# Patient Record
Sex: FEMALE | Race: WHITE | Employment: FULL TIME | URBAN - NONMETROPOLITAN AREA
[De-identification: names, ages, dates, MRNs, and addresses within clinical notes are randomized per-mention and may not be internally consistent; named-entity substitution may affect disease eponyms.]

---

## 2017-06-12 ENCOUNTER — TELEPHONE (OUTPATIENT)
Dept: FAMILY MEDICINE CLINIC | Age: 32
End: 2017-06-12

## 2017-06-15 RX ORDER — MONTELUKAST SODIUM 4 MG/1
1 TABLET, CHEWABLE ORAL 2 TIMES DAILY
Qty: 60 TABLET | Refills: 3 | Status: SHIPPED | OUTPATIENT
Start: 2017-06-15 | End: 2017-07-21

## 2017-07-21 ENCOUNTER — OFFICE VISIT (OUTPATIENT)
Dept: FAMILY MEDICINE CLINIC | Age: 32
End: 2017-07-21

## 2017-07-21 VITALS
SYSTOLIC BLOOD PRESSURE: 122 MMHG | OXYGEN SATURATION: 98 % | HEART RATE: 71 BPM | DIASTOLIC BLOOD PRESSURE: 84 MMHG | HEIGHT: 61 IN | WEIGHT: 190 LBS | BODY MASS INDEX: 35.87 KG/M2

## 2017-07-21 DIAGNOSIS — N92.6 IRREGULAR MENSTRUAL CYCLE: Primary | ICD-10-CM

## 2017-07-21 DIAGNOSIS — R53.83 FATIGUE, UNSPECIFIED TYPE: ICD-10-CM

## 2017-07-21 LAB
A/G RATIO: 1.4 (ref 1.1–2.2)
ALBUMIN SERPL-MCNC: 4.2 G/DL (ref 3.4–5)
ALP BLD-CCNC: 140 U/L (ref 40–129)
ALT SERPL-CCNC: 16 U/L (ref 10–40)
ANION GAP SERPL CALCULATED.3IONS-SCNC: 11 MMOL/L (ref 3–16)
AST SERPL-CCNC: 16 U/L (ref 15–37)
BASOPHILS ABSOLUTE: 0.1 K/UL (ref 0–0.2)
BASOPHILS RELATIVE PERCENT: 0.9 %
BILIRUB SERPL-MCNC: <0.2 MG/DL (ref 0–1)
BUN BLDV-MCNC: 14 MG/DL (ref 7–20)
CALCIUM SERPL-MCNC: 8.8 MG/DL (ref 8.3–10.6)
CHLORIDE BLD-SCNC: 106 MMOL/L (ref 99–110)
CO2: 24 MMOL/L (ref 21–32)
CREAT SERPL-MCNC: 0.6 MG/DL (ref 0.6–1.1)
EOSINOPHILS ABSOLUTE: 0.2 K/UL (ref 0–0.6)
EOSINOPHILS RELATIVE PERCENT: 2.6 %
FOLATE: 10.47 NG/ML (ref 4.78–24.2)
GFR AFRICAN AMERICAN: >60
GFR NON-AFRICAN AMERICAN: >60
GLOBULIN: 3 G/DL
GLUCOSE BLD-MCNC: 98 MG/DL (ref 70–99)
HCT VFR BLD CALC: 36.2 % (ref 36–48)
HEMOGLOBIN: 12 G/DL (ref 12–16)
LYMPHOCYTES ABSOLUTE: 1.2 K/UL (ref 1–5.1)
LYMPHOCYTES RELATIVE PERCENT: 18.2 %
MCH RBC QN AUTO: 26.5 PG (ref 26–34)
MCHC RBC AUTO-ENTMCNC: 33.2 G/DL (ref 31–36)
MCV RBC AUTO: 79.9 FL (ref 80–100)
MONOCYTES ABSOLUTE: 0.6 K/UL (ref 0–1.3)
MONOCYTES RELATIVE PERCENT: 8.8 %
NEUTROPHILS ABSOLUTE: 4.7 K/UL (ref 1.7–7.7)
NEUTROPHILS RELATIVE PERCENT: 69.5 %
PDW BLD-RTO: 13.7 % (ref 12.4–15.4)
PLATELET # BLD: 353 K/UL (ref 135–450)
PMV BLD AUTO: 8.1 FL (ref 5–10.5)
POTASSIUM SERPL-SCNC: 4.3 MMOL/L (ref 3.5–5.1)
RBC # BLD: 4.53 M/UL (ref 4–5.2)
SODIUM BLD-SCNC: 141 MMOL/L (ref 136–145)
T4 FREE: 0.9 NG/DL (ref 0.9–1.8)
TOTAL PROTEIN: 7.2 G/DL (ref 6.4–8.2)
TSH SERPL DL<=0.05 MIU/L-ACNC: 1.36 UIU/ML (ref 0.27–4.2)
VITAMIN B-12: 495 PG/ML (ref 211–911)
WBC # BLD: 6.8 K/UL (ref 4–11)

## 2017-07-21 PROCEDURE — 36415 COLL VENOUS BLD VENIPUNCTURE: CPT | Performed by: NURSE PRACTITIONER

## 2017-07-21 PROCEDURE — 99213 OFFICE O/P EST LOW 20 MIN: CPT | Performed by: NURSE PRACTITIONER

## 2017-07-21 ASSESSMENT — ENCOUNTER SYMPTOMS
EYES NEGATIVE: 1
ALLERGIC/IMMUNOLOGIC NEGATIVE: 1
GASTROINTESTINAL NEGATIVE: 1
RESPIRATORY NEGATIVE: 1

## 2017-09-01 ENCOUNTER — TELEPHONE (OUTPATIENT)
Dept: FAMILY MEDICINE CLINIC | Age: 32
End: 2017-09-01

## 2017-09-21 ENCOUNTER — TELEPHONE (OUTPATIENT)
Dept: FAMILY MEDICINE CLINIC | Age: 32
End: 2017-09-21

## 2017-09-28 ENCOUNTER — TELEPHONE (OUTPATIENT)
Dept: FAMILY MEDICINE CLINIC | Age: 32
End: 2017-09-28

## 2017-09-28 RX ORDER — CEFDINIR 300 MG/1
300 CAPSULE ORAL 2 TIMES DAILY
Qty: 20 CAPSULE | Refills: 0 | Status: SHIPPED | OUTPATIENT
Start: 2017-09-28 | End: 2017-10-08

## 2017-10-25 ENCOUNTER — OFFICE VISIT (OUTPATIENT)
Dept: FAMILY MEDICINE CLINIC | Age: 32
End: 2017-10-25

## 2017-10-25 VITALS
SYSTOLIC BLOOD PRESSURE: 136 MMHG | WEIGHT: 194.8 LBS | DIASTOLIC BLOOD PRESSURE: 82 MMHG | HEART RATE: 108 BPM | HEIGHT: 61 IN | TEMPERATURE: 98.2 F | BODY MASS INDEX: 36.78 KG/M2 | OXYGEN SATURATION: 98 %

## 2017-10-25 DIAGNOSIS — J40 BRONCHITIS: Primary | ICD-10-CM

## 2017-10-25 PROCEDURE — 99213 OFFICE O/P EST LOW 20 MIN: CPT | Performed by: FAMILY MEDICINE

## 2017-10-25 RX ORDER — BENZONATATE 200 MG/1
200 CAPSULE ORAL 3 TIMES DAILY PRN
Qty: 30 CAPSULE | Refills: 0 | Status: SHIPPED | OUTPATIENT
Start: 2017-10-25 | End: 2017-11-04

## 2017-10-25 RX ORDER — DOXYCYCLINE HYCLATE 100 MG
100 TABLET ORAL 2 TIMES DAILY
Qty: 20 TABLET | Refills: 0 | Status: SHIPPED | OUTPATIENT
Start: 2017-10-25 | End: 2017-11-04

## 2017-10-25 ASSESSMENT — ENCOUNTER SYMPTOMS
NAUSEA: 0
VOMITING: 0
COUGH: 1

## 2017-11-06 ENCOUNTER — TELEPHONE (OUTPATIENT)
Dept: FAMILY MEDICINE CLINIC | Age: 32
End: 2017-11-06

## 2017-11-06 NOTE — TELEPHONE ENCOUNTER
Not sure that is a thyroid issue. Thyroid labs were ok 4 mo ago. Could consider rpt tsh, ft4, free t3.   Could also consider endo referral

## 2017-11-07 DIAGNOSIS — L65.9 HAIR LOSS: Primary | ICD-10-CM

## 2017-11-07 DIAGNOSIS — R45.86 MOOD ALTERED: ICD-10-CM

## 2017-11-20 ENCOUNTER — TELEPHONE (OUTPATIENT)
Dept: FAMILY MEDICINE CLINIC | Age: 32
End: 2017-11-20

## 2018-02-22 ENCOUNTER — OFFICE VISIT (OUTPATIENT)
Dept: ORTHOPEDIC SURGERY | Age: 33
End: 2018-02-22

## 2018-02-22 ENCOUNTER — TELEPHONE (OUTPATIENT)
Dept: ORTHOPEDIC SURGERY | Age: 33
End: 2018-02-22

## 2018-02-22 VITALS
BODY MASS INDEX: 36.8 KG/M2 | HEART RATE: 90 BPM | SYSTOLIC BLOOD PRESSURE: 129 MMHG | DIASTOLIC BLOOD PRESSURE: 91 MMHG | WEIGHT: 194.89 LBS | HEIGHT: 61 IN

## 2018-02-22 DIAGNOSIS — M25.572 ACUTE LEFT ANKLE PAIN: Primary | ICD-10-CM

## 2018-02-22 DIAGNOSIS — M25.372 ANKLE INSTABILITY, LEFT: ICD-10-CM

## 2018-02-22 PROCEDURE — L4361 PNEUMA/VAC WALK BOOT PRE OTS: HCPCS | Performed by: ORTHOPAEDIC SURGERY

## 2018-02-22 PROCEDURE — 99203 OFFICE O/P NEW LOW 30 MIN: CPT | Performed by: ORTHOPAEDIC SURGERY

## 2018-02-22 NOTE — PROGRESS NOTES
the right lower extremity does not show any tenderness, deformity or injury. Range of motion is unremarkable. There is no gross instability. There are no rashes, ulcerations or lesions. Strength and tone are normal.    Radiology:     X-rays obtained and reviewed in office:  Views 3  Location left ankle  Impression 2 views of the left foot obtained previously show no obvious fracture or mortise is well reduced          Assessment :  Left ankle sprain strain with instability    Impression:  Encounter Diagnosis   Name Primary?  Acute left ankle pain Yes       Office Procedures:  Orders Placed This Encounter   Procedures    OTS FP Pneumatic Walking Boot Tall DJO     Patient was prescribed a [employer]. The left ankle will require stabilization / immobilization from this semi-rigid / rigid orthosis to improve their function. The orthosis will assist in protecting the affected area, provide functional support and facilitate healing. The patient was educated and fit by a healthcare professional with expert knowledge and specialization in brace application while under the direct supervision of the physician. Verbal and written instructions for the use of and application of this item were provided. They were instructed to contact the office immediately should the brace result in increased pain, decreased sensation, increased swelling or worsening of the condition. Treatment Plan:  I spent 30 minutes with this patient and her mother greater than 50% of time face-to-face discussing treatment options. I would recommend that she go into a high tide boot which we gave her today along with Ace bandage she'll ice and elevate take anti-inflammatories and follow-up with me in 3 weeks repeat x-rays of the ankle and foot.   I gave her a note to be off of work until then 22 the worksite unable to accommodate use of crutches or a boot

## 2018-03-12 DIAGNOSIS — M25.372 ANKLE INSTABILITY, LEFT: Primary | ICD-10-CM

## 2018-03-19 ENCOUNTER — TELEPHONE (OUTPATIENT)
Dept: ORTHOPEDIC SURGERY | Age: 33
End: 2018-03-19

## 2018-03-20 DIAGNOSIS — M25.372 ANKLE INSTABILITY, LEFT: Primary | ICD-10-CM

## 2018-03-23 ENCOUNTER — OFFICE VISIT (OUTPATIENT)
Dept: ORTHOPEDIC SURGERY | Age: 33
End: 2018-03-23

## 2018-03-23 VITALS
HEART RATE: 95 BPM | HEIGHT: 61 IN | WEIGHT: 194.89 LBS | DIASTOLIC BLOOD PRESSURE: 90 MMHG | SYSTOLIC BLOOD PRESSURE: 141 MMHG | BODY MASS INDEX: 36.8 KG/M2

## 2018-03-23 DIAGNOSIS — M25.372 ANKLE INSTABILITY, LEFT: Primary | ICD-10-CM

## 2018-03-23 DIAGNOSIS — M79.672 LEFT FOOT PAIN: ICD-10-CM

## 2018-03-23 PROCEDURE — L1902 AFO ANKLE GAUNTLET PRE OTS: HCPCS | Performed by: ORTHOPAEDIC SURGERY

## 2018-03-23 PROCEDURE — 99212 OFFICE O/P EST SF 10 MIN: CPT | Performed by: ORTHOPAEDIC SURGERY

## 2018-03-26 ENCOUNTER — E-VISIT (OUTPATIENT)
Dept: FAMILY MEDICINE CLINIC | Age: 33
End: 2018-03-26

## 2018-03-26 DIAGNOSIS — J01.90 ACUTE SINUSITIS, RECURRENCE NOT SPECIFIED, UNSPECIFIED LOCATION: Primary | ICD-10-CM

## 2018-03-26 PROCEDURE — 99444 PR PHYSICIAN ONLINE EVALUATION & MANAGEMENT SERVICE: CPT | Performed by: FAMILY MEDICINE

## 2018-03-26 RX ORDER — CEFDINIR 300 MG/1
300 CAPSULE ORAL 2 TIMES DAILY
Qty: 20 CAPSULE | Refills: 0 | Status: SHIPPED | OUTPATIENT
Start: 2018-03-26 | End: 2018-04-05

## 2018-03-26 ASSESSMENT — LIFESTYLE VARIABLES: SMOKING_STATUS: NO

## 2018-04-20 ENCOUNTER — OFFICE VISIT (OUTPATIENT)
Dept: ORTHOPEDIC SURGERY | Age: 33
End: 2018-04-20

## 2018-04-20 VITALS
WEIGHT: 194.89 LBS | HEIGHT: 61 IN | DIASTOLIC BLOOD PRESSURE: 82 MMHG | SYSTOLIC BLOOD PRESSURE: 123 MMHG | BODY MASS INDEX: 36.8 KG/M2 | HEART RATE: 88 BPM

## 2018-04-20 DIAGNOSIS — M25.372 ANKLE INSTABILITY, LEFT: Primary | ICD-10-CM

## 2018-04-20 PROCEDURE — 99212 OFFICE O/P EST SF 10 MIN: CPT | Performed by: ORTHOPAEDIC SURGERY

## 2018-09-04 ENCOUNTER — TELEPHONE (OUTPATIENT)
Dept: FAMILY MEDICINE CLINIC | Age: 33
End: 2018-09-04

## 2018-09-04 RX ORDER — CEFDINIR 300 MG/1
300 CAPSULE ORAL 2 TIMES DAILY
Qty: 20 CAPSULE | Refills: 0 | Status: SHIPPED | OUTPATIENT
Start: 2018-09-04 | End: 2018-09-14

## 2019-01-15 ENCOUNTER — OFFICE VISIT (OUTPATIENT)
Dept: FAMILY MEDICINE CLINIC | Age: 34
End: 2019-01-15
Payer: COMMERCIAL

## 2019-01-15 VITALS
TEMPERATURE: 98.1 F | SYSTOLIC BLOOD PRESSURE: 110 MMHG | WEIGHT: 197.4 LBS | HEART RATE: 114 BPM | OXYGEN SATURATION: 96 % | BODY MASS INDEX: 37.27 KG/M2 | HEIGHT: 61 IN | DIASTOLIC BLOOD PRESSURE: 82 MMHG

## 2019-01-15 DIAGNOSIS — R11.11 NON-INTRACTABLE VOMITING WITHOUT NAUSEA, UNSPECIFIED VOMITING TYPE: ICD-10-CM

## 2019-01-15 DIAGNOSIS — J06.9 VIRAL URI: Primary | ICD-10-CM

## 2019-01-15 PROCEDURE — 99214 OFFICE O/P EST MOD 30 MIN: CPT | Performed by: FAMILY MEDICINE

## 2019-01-15 ASSESSMENT — ENCOUNTER SYMPTOMS
NAUSEA: 1
DIARRHEA: 0
VOMITING: 1

## 2019-03-06 ENCOUNTER — TELEPHONE (OUTPATIENT)
Dept: FAMILY MEDICINE CLINIC | Age: 34
End: 2019-03-06

## 2019-07-11 ENCOUNTER — TELEPHONE (OUTPATIENT)
Dept: FAMILY MEDICINE CLINIC | Age: 34
End: 2019-07-11

## 2019-07-11 RX ORDER — AMOXICILLIN 875 MG/1
875 TABLET, COATED ORAL 2 TIMES DAILY
Qty: 20 TABLET | Refills: 0 | Status: SHIPPED | OUTPATIENT
Start: 2019-07-11 | End: 2019-07-21

## 2019-07-11 NOTE — TELEPHONE ENCOUNTER
Pt called c/o scratchy throat, itchy eyes, nose is clogging up and she has to breath through her mouth. UP and down all night blowing her nose so she can breath. Mucus is green in color. Sxs for 36hrs. She's taking an allergy pill and it's not working. She cannot come in due to work. Would you be willing to call something in for her to [facility]. Call something on the $5 list please.

## 2020-02-05 ENCOUNTER — TELEPHONE (OUTPATIENT)
Dept: FAMILY MEDICINE CLINIC | Age: 35
End: 2020-02-05

## 2020-02-05 ENCOUNTER — OFFICE VISIT (OUTPATIENT)
Dept: FAMILY MEDICINE CLINIC | Age: 35
End: 2020-02-05
Payer: COMMERCIAL

## 2020-02-05 VITALS
WEIGHT: 204 LBS | DIASTOLIC BLOOD PRESSURE: 80 MMHG | HEART RATE: 82 BPM | SYSTOLIC BLOOD PRESSURE: 122 MMHG | OXYGEN SATURATION: 97 % | BODY MASS INDEX: 38.52 KG/M2

## 2020-02-05 LAB
BILIRUBIN, POC: NORMAL
BLOOD URINE, POC: NORMAL
CLARITY, POC: CLEAR
COLOR, POC: YELLOW
GLUCOSE URINE, POC: NORMAL
KETONES, POC: NORMAL
LEUKOCYTE EST, POC: NORMAL
NITRITE, POC: NORMAL
PH, POC: 5.5
PROTEIN, POC: NORMAL
SPECIFIC GRAVITY, POC: 1.02
UROBILINOGEN, POC: 0.2

## 2020-02-05 PROCEDURE — 99213 OFFICE O/P EST LOW 20 MIN: CPT | Performed by: NURSE PRACTITIONER

## 2020-02-05 PROCEDURE — 81002 URINALYSIS NONAUTO W/O SCOPE: CPT | Performed by: NURSE PRACTITIONER

## 2020-02-05 ASSESSMENT — PATIENT HEALTH QUESTIONNAIRE - PHQ9
1. LITTLE INTEREST OR PLEASURE IN DOING THINGS: 0
SUM OF ALL RESPONSES TO PHQ QUESTIONS 1-9: 0
SUM OF ALL RESPONSES TO PHQ QUESTIONS 1-9: 0
2. FEELING DOWN, DEPRESSED OR HOPELESS: 0
SUM OF ALL RESPONSES TO PHQ9 QUESTIONS 1 & 2: 0

## 2020-02-05 NOTE — PROGRESS NOTES
Jaison Putnam is a 27 y.o. female who presents with complaints of frequency, urgency, foul smelling urine. These symptoms have been present for 2 days. Accompanying symptoms include:  urinary frequency, chills and flank pain on bilateral.  The patient denies:  sweating. There \"has no idea\" a history of previous UTI . The patient is  sexually active. PHYSICAL EXAM:  Vitals:    02/05/20 0832   BP: 122/80   Pulse: 82   SpO2: 97%   Weight: 204 lb (92.5 kg)     General:  Alert, cooperative in no distress. CV:  Regular rate and rhythm without murmur. Lungs:  Clear to auscultation bilaterally. Back:  inspection of back is normal.  Abdomen:  Abdomen soft, non-tender. BS normal. No masses,  No organomegaly. Extremities:  No edema. Urinalysis results significant for:   Results for orders placed or performed in visit on 02/05/20   POCT Urinalysis no Micro   Result Value Ref Range    Color, UA yellow     Clarity, UA clear     Glucose, UA POC neg     Bilirubin, UA neg     Ketones, UA neg     Spec Grav, UA 1.025     Blood, UA POC trace     pH, UA 5.5     Protein, UA POC neg     Urobilinogen, UA 0.2     Leukocytes, UA trace     Nitrite, UA neg        ASSESSMENT and PLAN  1. Burning with urination    - POCT Urinalysis no Micro  - Urine Culture    -Will follow up if culture positive. Patient counseled on the following today:  Maintain good hydration. Women with frequent  Or intercourse related UTI should empty bladder immediately before and after intercourse and consider postcoital antibiotic treatment. Take showers instead of tub baths. Avoid feminine hygiene sprays and scented douches. Wipe urethra from to back.

## 2020-02-06 LAB — URINE CULTURE, ROUTINE: NORMAL

## 2020-07-07 ENCOUNTER — TELEPHONE (OUTPATIENT)
Dept: FAMILY MEDICINE CLINIC | Age: 35
End: 2020-07-07

## 2020-07-07 NOTE — TELEPHONE ENCOUNTER
Recommend ice and benadryl. abx typically not necessary for bee stings.   Call back if sxs fail to improve over next couple of days

## 2020-07-07 NOTE — TELEPHONE ENCOUNTER
Pt called. Unable to come in and unable to do VV with her new phone. She had a bee sting her on her arm  on Sunday and it's a little red and swollen, slight fever in it. She's allergic to bee stings and is requesting AB (anything but Amox). She hasn't treated it at all b/c she was afraid to do that.    Uses [facility]

## 2020-07-31 ENCOUNTER — TELEPHONE (OUTPATIENT)
Dept: FAMILY MEDICINE CLINIC | Age: 35
End: 2020-07-31

## 2020-07-31 RX ORDER — CEFDINIR 300 MG/1
300 CAPSULE ORAL 2 TIMES DAILY
Qty: 20 CAPSULE | Refills: 0 | Status: SHIPPED | OUTPATIENT
Start: 2020-07-31 | End: 2020-08-10

## 2020-07-31 NOTE — TELEPHONE ENCOUNTER
Pt called. She is having sxs of urinary burning, itching,frequency x 2 days. Urine is dark yellow. No blood. Also, suffering from a sinus infection. Sxs are ST, feels like she always has something in her throat, voice sounds deeper, she is having sinus pain and pressure x 2 days. She cannot come in due to work.   She would like something on the $4 list.   Uses [facility]

## 2020-09-17 ENCOUNTER — TELEPHONE (OUTPATIENT)
Dept: FAMILY MEDICINE CLINIC | Age: 35
End: 2020-09-17

## 2020-09-17 RX ORDER — CETIRIZINE HYDROCHLORIDE 5 MG/1
5 TABLET ORAL DAILY
Qty: 30 TABLET | Refills: 5 | Status: SHIPPED | OUTPATIENT
Start: 2020-09-17 | End: 2021-01-29

## 2020-09-17 NOTE — TELEPHONE ENCOUNTER
----- Message from Michael Chi sent at 9/17/2020  7:35 AM EDT -----  Subject: Message to Provider    QUESTIONS  Information for Provider? Patient is requesting for Dr. Ashley Downs to call a   prescription for her allergies   headaches   sore throat and dry cough from seasonal allergies. Request prescription   to be on the \"$5 \" list. [address] [facility] [place] [facility]  ---------------------------------------------------------------------------  --------------  [address]  What is the best way for the office to contact you? Do not leave any   message   patient will call back for answer  Preferred Call Back Phone Number? [phone]  ---------------------------------------------------------------------------  --------------  SCRIPT ANSWERS  Relationship to Patient?  Self

## 2021-01-29 ENCOUNTER — VIRTUAL VISIT (OUTPATIENT)
Dept: FAMILY MEDICINE CLINIC | Age: 36
End: 2021-01-29
Payer: COMMERCIAL

## 2021-01-29 DIAGNOSIS — R30.0 DIFFICULT OR PAINFUL URINATION: Primary | ICD-10-CM

## 2021-01-29 PROCEDURE — 99213 OFFICE O/P EST LOW 20 MIN: CPT | Performed by: NURSE PRACTITIONER

## 2021-01-29 RX ORDER — CIPROFLOXACIN 250 MG/1
250 TABLET, FILM COATED ORAL 2 TIMES DAILY
Qty: 6 TABLET | Refills: 0 | Status: SHIPPED | OUTPATIENT
Start: 2021-01-29 | End: 2021-02-01

## 2021-01-29 ASSESSMENT — ENCOUNTER SYMPTOMS
SHORTNESS OF BREATH: 0
DIARRHEA: 0
COUGH: 0
PHOTOPHOBIA: 0
EYE ITCHING: 0
COLOR CHANGE: 0
BLOOD IN STOOL: 0
RHINORRHEA: 0
EYE DISCHARGE: 0
CHEST TIGHTNESS: 0
CHOKING: 0
SORE THROAT: 0
SINUS PRESSURE: 0
SINUS PAIN: 0
NAUSEA: 0
ABDOMINAL PAIN: 0
VOICE CHANGE: 0
CONSTIPATION: 0
STRIDOR: 0
WHEEZING: 0
BACK PAIN: 0
EYE PAIN: 0
EYE REDNESS: 0
TROUBLE SWALLOWING: 0
VOMITING: 0

## 2021-01-29 ASSESSMENT — PATIENT HEALTH QUESTIONNAIRE - PHQ9
SUM OF ALL RESPONSES TO PHQ9 QUESTIONS 1 & 2: 0
SUM OF ALL RESPONSES TO PHQ QUESTIONS 1-9: 0

## 2021-01-29 NOTE — PROGRESS NOTES
2021    TELEHEALTH EVALUATION -- Audio/Visual (During IWMJQ-08 public health emergency)    HPI:    Jorgito Downs (:  1985) has requested an audio/video evaluation for the following concern(s):    SUBJECTIVE: Jorgito Downs is a 38 y.o. female Feels like she is \"pissing nails\" who complains of urinary frequency, urgency and dysuria x few days days, without flank pain, fever, chills, or abnormal vaginal discharge or bleeding. She has tried OTC FSLogix yellow/brown pills. Denies any improvement. Denies fever and chills. Denies any vaginal discharge. Patient is a poor historian. Last one was 4-6 months ago. ASSESSMENT: UTI uncomplicated without evidence of pyelonephritis    PLAN: Treatment per orders - also push fluids, may use Pyridium OTC prn. Call or return to clinic prn if these symptoms worsen or fail to improve as anticipated. Review of Systems   Constitutional: Positive for fatigue. Negative for activity change, appetite change, chills, diaphoresis, fever and unexpected weight change. HENT: Negative for congestion, ear discharge, ear pain, hearing loss, nosebleeds, postnasal drip, rhinorrhea, sinus pressure, sinus pain, sneezing, sore throat, tinnitus, trouble swallowing and voice change. Eyes: Negative for photophobia, pain, discharge, redness and itching. Respiratory: Negative for cough, choking, chest tightness, shortness of breath, wheezing and stridor. Cardiovascular: Negative for chest pain, palpitations and leg swelling. Gastrointestinal: Negative for abdominal pain, blood in stool, constipation, diarrhea, nausea and vomiting. Endocrine: Negative for cold intolerance, heat intolerance, polydipsia and polyuria. Genitourinary: Positive for frequency and urgency. Negative for difficulty urinating, dysuria, enuresis, flank pain and hematuria.         Painful urination Pulmonary/Chest: [x] Respiratory effort normal.  [] No visualized signs of difficulty breathing or respiratory distress        [] Abnormal-      Musculoskeletal:   [x] Normal gait with no signs of ataxia         [x] Normal range of motion of neck        [] Abnormal-       Neurological:        [x] No Facial Asymmetry (Cranial nerve 7 motor function) (limited exam to video visit)          [] No gaze palsy        [] Abnormal-         Skin:        [] No significant exanthematous lesions or discoloration noted on facial skin         [] Abnormal-            Psychiatric:       [x] Normal Affect [] No Hallucinations        [] Abnormal-     Other pertinent observable physical exam findings-     Due to this being a TeleHealth encounter, evaluation of the following organ systems is limited: Vitals/Constitutional/EENT/Resp/CV/GI//MS/Neuro/Skin/Heme-Lymph-Imm. ASSESSMENT/PLAN:  1. Difficult or painful urination    - ciprofloxacin (CIPRO) 250 MG tablet; Take 1 tablet by mouth 2 times daily for 3 days  Dispense: 6 tablet; Refill: 0    Educate dif she does not improve then she needs to come in to get a urinalysis and urine culture. She verbalized understanding. Return if symptoms worsen or fail to improve. An  electronic signature was used to authenticate this note. --Jorge Luis Ortiz, NILAY - CNP on 1/29/2021 at 9:51 AM        Pursuant to the emergency declaration under the [facility], [facility] waiver authority and the [employer] and [email or web address] General Act, this Virtual  Visit was conducted, with patient's consent, to reduce the patient's risk of exposure to COVID-19 and provide continuity of care for an established patient. Services were provided through a video synchronous discussion virtually to substitute for in-person clinic visit. This document was prepared by a combination of typing and transcription through a voice recognition software.

## 2021-06-16 ENCOUNTER — OFFICE VISIT (OUTPATIENT)
Dept: FAMILY MEDICINE CLINIC | Age: 36
End: 2021-06-16
Payer: COMMERCIAL

## 2021-06-16 VITALS
HEART RATE: 81 BPM | OXYGEN SATURATION: 98 % | DIASTOLIC BLOOD PRESSURE: 82 MMHG | BODY MASS INDEX: 35.69 KG/M2 | WEIGHT: 189 LBS | SYSTOLIC BLOOD PRESSURE: 124 MMHG

## 2021-06-16 DIAGNOSIS — Z01.818 PRE-OP EXAM: Primary | ICD-10-CM

## 2021-06-16 DIAGNOSIS — M25.572 ACUTE LEFT ANKLE PAIN: ICD-10-CM

## 2021-06-16 PROCEDURE — 99214 OFFICE O/P EST MOD 30 MIN: CPT | Performed by: NURSE PRACTITIONER

## 2021-06-16 ASSESSMENT — ENCOUNTER SYMPTOMS
SHORTNESS OF BREATH: 0
CHOKING: 0
VOICE CHANGE: 0
EYE REDNESS: 0
DIARRHEA: 0
EYE ITCHING: 0
RHINORRHEA: 0
ABDOMINAL PAIN: 0
WHEEZING: 0
BACK PAIN: 0
PHOTOPHOBIA: 0
VOMITING: 0
COLOR CHANGE: 0
BLOOD IN STOOL: 0
EYE DISCHARGE: 0
EYE PAIN: 0
NAUSEA: 0
SORE THROAT: 0
CHEST TIGHTNESS: 0
STRIDOR: 0
CONSTIPATION: 0
SINUS PRESSURE: 0
SINUS PAIN: 0
TROUBLE SWALLOWING: 0
COUGH: 0

## 2021-06-16 NOTE — PROGRESS NOTES
[facility]  YOB: 1985    Date of Service:  6/16/2021      Wt Readings from Last 2 Encounters:   06/16/21 189 lb (85.7 kg)   02/05/20 204 lb (92.5 kg)       BP Readings from Last 3 Encounters:   06/16/21 124/82   02/05/20 122/80   01/15/19 110/82        Chief Complaint   Patient presents with    Pre-op Exam     left endoscopic gastroc lengthening partial plantar fasciectomy and release of Baxters nerve at [facility] on 6/21/21 by Dr Pavel Joe        No Known Allergies    No outpatient medications have been marked as taking for the 6/16/21 encounter (Office Visit) with NILAY Workman CNP. This patient presents to the office today for a preoperative consultation for ankle pain at the request of surgeon, Dr. Pavel Joe, who plans on performing endoscopic gastroc lengthening partial plantar fasciectomy and release of Baxters nerve   on June 21 at [facility]. The current problem began 6 months ago, and symptoms have been worsening with time. Conservative therapy: Yes: Physical therapy, exercises, which has been ineffective. .    Planned anesthesia: General   Known anesthesia problems: None   Bleeding risk: No recent or remote history of abnormal bleeding  Personal or FH of DVT/PE: No    Patient objection to receiving blood products: No    States she does not need COVID tested. No past medical history on file.     Past Surgical History:   Procedure Laterality Date    CHOLECYSTECTOMY         Family History   Problem Relation Age of Onset    Cancer Other     Thyroid Disease Mother     Thyroid Disease Maternal Grandmother        Social History     Socioeconomic History    Marital status:      Spouse name: Not on file    Number of children: Not on file    Years of education: Not on file    Highest education level: Not on file   Occupational History    Not on file   Tobacco Use    Smoking status: Never Smoker    Smokeless tobacco: Never Used   Substance and Sexual Activity    Alcohol use: Yes     Comment: occ    Drug use: Not on file    Sexual activity: Not on file   Other Topics Concern    Not on file   Social History Narrative    Not on file     Social Determinants of Health     Financial Resource Strain:     Difficulty of Paying Living Expenses:    Food Insecurity:     Worried About Running Out of Food in the Last Year:     [address] in the Last Year:    Transportation Needs:     Lack of Transportation (Medical):  Lack of Transportation (Non-Medical):    Physical Activity:     Days of Exercise per Week:     Minutes of Exercise per Session:    Stress:     Feeling of Stress :    Social Connections:     Frequency of Communication with Friends and Family:     Frequency of Social Gatherings with Friends and Family:     Attends Taoism Services:     Active Member of Clubs or Organizations:     Attends Club or Organization Meetings:     Marital Status:    Intimate Partner Violence:     Fear of Current or Ex-Partner:     Emotionally Abused:     Physically Abused:     Sexually Abused:        Review of Systems  Review of Systems   Constitutional: Negative for activity change, appetite change, chills, diaphoresis, fatigue, fever and unexpected weight change. HENT: Negative for congestion, ear discharge, ear pain, hearing loss, nosebleeds, postnasal drip, rhinorrhea, sinus pressure, sinus pain, sneezing, sore throat, tinnitus, trouble swallowing and voice change. Eyes: Negative for photophobia, pain, discharge, redness and itching. Respiratory: Negative for cough, choking, chest tightness, shortness of breath, wheezing and stridor. Cardiovascular: Negative for chest pain, palpitations and leg swelling. Gastrointestinal: Negative for abdominal pain, blood in stool, constipation, diarrhea, nausea and vomiting. Endocrine: Negative for cold intolerance, heat intolerance, polydipsia and polyuria.    Genitourinary: Negative for Pulmonary:      Effort: Pulmonary effort is normal. No respiratory distress. Breath sounds: Normal breath sounds. No stridor. No decreased breath sounds, wheezing, rhonchi or rales. Musculoskeletal:         General: Swelling (right ankle) present. No tenderness. Cervical back: Normal range of motion. No rigidity or tenderness. Lymphadenopathy:      Cervical: No cervical adenopathy. Skin:     General: Skin is warm and dry. Capillary Refill: Capillary refill takes less than 2 seconds. Findings: No rash. Neurological:      Mental Status: She is alert and oriented to person, place, and time. Sensory: Sensation is intact. Motor: Motor function is intact. Coordination: Coordination normal.   Psychiatric:         Attention and Perception: Attention and perception normal.         Mood and Affect: Mood normal.         Speech: Speech normal.         Behavior: Behavior normal. Behavior is cooperative. Thought Content: Thought content normal.         Cognition and Memory: Cognition normal.         Judgment: Judgment normal.       Lab Review   No visits with results within 2 Month(s) from this visit. Latest known visit with results is:   Office Visit on 02/05/2020   Component Date Value    Color, UA 02/05/2020 yellow     Clarity, UA 02/05/2020 clear     Glucose, UA POC 02/05/2020 neg     Bilirubin, UA 02/05/2020 neg     Ketones, UA 02/05/2020 neg     Spec Grav, UA 02/05/2020 1.025     Blood, UA POC 02/05/2020 trace     pH, UA 02/05/2020 5.5     Protein, UA POC 02/05/2020 neg     Urobilinogen, UA 02/05/2020 0.2     Leukocytes, UA 02/05/2020 trace     Nitrite, UA 02/05/2020 neg     Urine Culture, Routine 02/05/2020 <50,000 CFU/ml mixed skin/urogenital levon. No further workup            Assessment:       39 y.o. patient with planned surgery as above.     Known risk factors for perioperative complications: None  Current medications which may produce withdrawal symptoms if withheld perioperatively: none    Diagnosis Orders   1. Pre-op exam     2. Acute left ankle pain          Plan:     1. Preoperative workup as follows:none  2. Change in medication regimen before surgery: None  3. Prophylaxis forcardiac events with perioperative beta-blockers: Not indicated  ACC/AHA indications for pre-operative beta-blocker use:    · Vascular surgery with history of postitive stress test  · Intermediate or high risk surgery with history of CAD   · Intermediate or high risk surgery with multiple clinical predictors of CAD- 2 of the following: history of compensated or prior heart failure, history of cerebrovascular disease, DM, or renal insufficiency    Routine administration of higher-dose, long-acting metoprolol in beta-blockernaïve patients on the day of surgery, and in the absence of dose titration is with an overall increase in mortality. Beta-blockers should be started days to weeks prior to surgery and titrated to pulse < 70.  4. Deep vein thrombosis prophylaxis: regimen to be chosen by surgical team  5. No contraindications to planned surgery    Cleared for surgery.

## 2021-06-17 ENCOUNTER — NURSE ONLY (OUTPATIENT)
Dept: FAMILY MEDICINE CLINIC | Age: 36
End: 2021-06-17
Payer: COMMERCIAL

## 2021-06-17 DIAGNOSIS — Z01.818 PRE-OP EXAM: ICD-10-CM

## 2021-06-17 LAB
ANION GAP SERPL CALCULATED.3IONS-SCNC: 12 MMOL/L (ref 3–16)
BUN BLDV-MCNC: 11 MG/DL (ref 7–20)
CALCIUM SERPL-MCNC: 9.6 MG/DL (ref 8.3–10.6)
CHLORIDE BLD-SCNC: 104 MMOL/L (ref 99–110)
CO2: 26 MMOL/L (ref 21–32)
CREAT SERPL-MCNC: 1 MG/DL (ref 0.6–1.1)
GFR AFRICAN AMERICAN: >60
GFR NON-AFRICAN AMERICAN: >60
GLUCOSE BLD-MCNC: 92 MG/DL (ref 70–99)
POTASSIUM SERPL-SCNC: 4.5 MMOL/L (ref 3.5–5.1)
SODIUM BLD-SCNC: 142 MMOL/L (ref 136–145)

## 2021-06-17 PROCEDURE — 36415 COLL VENOUS BLD VENIPUNCTURE: CPT | Performed by: FAMILY MEDICINE

## 2021-08-03 ENCOUNTER — TELEPHONE (OUTPATIENT)
Dept: FAMILY MEDICINE CLINIC | Age: 36
End: 2021-08-03

## 2021-08-03 NOTE — TELEPHONE ENCOUNTER
Pt states that when she pees it feels like she's peeing needles. Has been going on since Sat or Sunday. No fever, no pain pressure in abdominal area. Uses [facility]. I offered her an appt and she refused. Said that she doesn't have $40 to come in. She states that she's getting ready to have surgery.

## 2021-12-27 ENCOUNTER — OFFICE VISIT (OUTPATIENT)
Dept: FAMILY MEDICINE CLINIC | Age: 36
End: 2021-12-27
Payer: COMMERCIAL

## 2021-12-27 VITALS
SYSTOLIC BLOOD PRESSURE: 120 MMHG | OXYGEN SATURATION: 97 % | WEIGHT: 202.6 LBS | DIASTOLIC BLOOD PRESSURE: 76 MMHG | HEART RATE: 93 BPM | HEIGHT: 61 IN | BODY MASS INDEX: 38.25 KG/M2 | TEMPERATURE: 98.2 F

## 2021-12-27 DIAGNOSIS — R51.9 NONINTRACTABLE EPISODIC HEADACHE, UNSPECIFIED HEADACHE TYPE: Primary | ICD-10-CM

## 2021-12-27 PROCEDURE — 99213 OFFICE O/P EST LOW 20 MIN: CPT | Performed by: FAMILY MEDICINE

## 2021-12-27 RX ORDER — IBUPROFEN 600 MG/1
600 TABLET ORAL 3 TIMES DAILY PRN
Qty: 90 TABLET | Refills: 0 | Status: SHIPPED | OUTPATIENT
Start: 2021-12-27

## 2021-12-27 RX ORDER — FLUTICASONE PROPIONATE 50 MCG
2 SPRAY, SUSPENSION (ML) NASAL DAILY
Qty: 16 G | Refills: 3 | Status: SHIPPED | OUTPATIENT
Start: 2021-12-27

## 2021-12-27 ASSESSMENT — ENCOUNTER SYMPTOMS
BLURRED VISION: 0
SORE THROAT: 0
SHORTNESS OF BREATH: 0
VOMITING: 0
SINUS COMPLAINT: 1
COUGH: 0
NAUSEA: 0

## 2021-12-27 NOTE — PROGRESS NOTES
Chief Complaint   Patient presents with    Sinus Problem    Migraine       HPI:  Isa Camara is a 39 y.o. (: 1985) here today   for   Sinus Problem  This is a chronic problem. The problem has been waxing and waning since onset. There has been no fever. Pertinent negatives include no chills, coughing, shortness of breath or sore throat. Past treatments include oral decongestants. The treatment provided no relief. Migraine   This is a new problem. The current episode started more than 1 month ago. The problem occurs intermittently. The problem has been gradually worsening. The pain is mild. Pertinent negatives include no blurred vision, coughing, loss of balance, nausea, sore throat or vomiting. The symptoms are aggravated by caffeine withdrawal. She has tried darkened room for the symptoms. The treatment provided no relief. Overall sxs worse over past yr or so re sinuses. Has not taken meds routinely. No other sinus meds. otc sinus meds not working. Sinus pain and pressure. No sig nasal congestion or drainage noted. Some pain to back of head, causes headaches as well. Sometimes to right side. No sig migraine sxs at this point. Wants to avoid. ibu helps headaches as well. Some time since last eye dr green. No sig issues w/ bright light/ loud noises. Headache worse as going to work, wonders about stress. Will drink 2 bottles of water per day. Patient's medications, allergies, past medical, surgical, social and family histories were reviewed and updated as appropriate. ROS:  Review of Systems   Constitutional: Negative for chills. HENT: Negative for sore throat. Eyes: Negative for blurred vision. Respiratory: Negative for cough and shortness of breath. Gastrointestinal: Negative for nausea and vomiting. Neurological: Negative for loss of balance. Prior to Visit Medications    Medication Sig Taking?  Authorizing Provider   fluticasone (FLONASE) 50 MCG/ACT nasal spray 2 sprays by Nasal route daily Yes Carrie Villanueva MD   ibuprofen (ADVIL;MOTRIN) 600 MG tablet Take 1 tablet by mouth 3 times daily as needed for Pain Yes Carrie Villanueva MD       No Known Allergies    OBJECTIVE:    /76   Pulse 93   Temp 98.2 °F (36.8 °C) (Tympanic)   Ht 5' 1.02\" (1.55 m)   Wt 202 lb 9.6 oz (91.9 kg)   SpO2 97%   BMI 38.26 kg/m²     BP Readings from Last 2 Encounters:   12/27/21 120/76   06/16/21 124/82       Wt Readings from Last 3 Encounters:   12/27/21 202 lb 9.6 oz (91.9 kg)   06/16/21 189 lb (85.7 kg)   02/05/20 204 lb (92.5 kg)        Physical Exam  Constitutional:       Appearance: Normal appearance. HENT:      Head: Normocephalic and atraumatic. Nose:      Right Sinus: Maxillary sinus tenderness and frontal sinus tenderness present. Left Sinus: Maxillary sinus tenderness and frontal sinus tenderness present. Eyes:      Extraocular Movements: Extraocular movements intact. Cardiovascular:      Rate and Rhythm: Normal rate and regular rhythm. Pulmonary:      Effort: Pulmonary effort is normal.      Breath sounds: Normal breath sounds. Skin:     General: Skin is warm and dry. Neurological:      General: No focal deficit present. Mental Status: She is alert and oriented to person, place, and time. Psychiatric:         Mood and Affect: Mood normal.         Behavior: Behavior normal.           ASSESSMENT/PLAN:    1. Nonintractable episodic headache, unspecified headache type  Recurrent headache symptoms. Mild tenderness over the sinuses, but no other significant sinus symptoms noted today. Trial of medication as below. Use ibuprofen only as needed. Flonase when sinus pressure is worse. Also recommend increase water intake and eye doctor appointment. She states is been sometime. No actual migraines to speak of based on history. Would not recommend prophylactic medication otherwise at this time.   - fluticasone (FLONASE) 50 MCG/ACT nasal

## 2022-05-11 ENCOUNTER — TELEPHONE (OUTPATIENT)
Dept: FAMILY MEDICINE CLINIC | Age: 37
End: 2022-05-11

## 2022-05-11 RX ORDER — OFLOXACIN 3 MG/ML
5 SOLUTION AURICULAR (OTIC) 2 TIMES DAILY
Qty: 10 ML | Refills: 0 | Status: SHIPPED | OUTPATIENT
Start: 2022-05-11 | End: 2022-05-18

## 2022-05-11 NOTE — TELEPHONE ENCOUNTER
Sounds as if may be more in the ear canal.  Trial of Floxin otic, 5 drops to affected ear twice daily. 7 days.   Appointment if fails to improve

## 2022-05-11 NOTE — TELEPHONE ENCOUNTER
Pt is having issues with her left ear. She said it hurts underneath her jaw line. She said her inner ear is swollen b/c her earbuds are not fitting in that ear like normal and her ear is sore on the inside. She cannot get off of work b/c she is day shift now. Started yesterday. No fever. Would you be willing to call something in for her to [facility]. She would prefer something on their $4 list if possible.

## 2022-07-12 ENCOUNTER — TELEPHONE (OUTPATIENT)
Dept: FAMILY MEDICINE CLINIC | Age: 37
End: 2022-07-12

## 2022-07-12 NOTE — TELEPHONE ENCOUNTER
[place] and the dust really bothers her allergies. She states that she's not using anything for her allergies at this time. allerg Pls advise. Uses [facility].

## 2022-10-14 ENCOUNTER — OFFICE VISIT (OUTPATIENT)
Dept: FAMILY MEDICINE CLINIC | Age: 37
End: 2022-10-14
Payer: COMMERCIAL

## 2022-10-14 VITALS
DIASTOLIC BLOOD PRESSURE: 80 MMHG | OXYGEN SATURATION: 97 % | WEIGHT: 196 LBS | SYSTOLIC BLOOD PRESSURE: 122 MMHG | HEIGHT: 61 IN | BODY MASS INDEX: 37 KG/M2 | HEART RATE: 74 BPM

## 2022-10-14 DIAGNOSIS — Z11.59 NEED FOR HEPATITIS B SCREENING TEST: ICD-10-CM

## 2022-10-14 DIAGNOSIS — Z11.4 SCREENING FOR HIV (HUMAN IMMUNODEFICIENCY VIRUS): ICD-10-CM

## 2022-10-14 DIAGNOSIS — Z23 NEED FOR HPV VACCINATION: ICD-10-CM

## 2022-10-14 DIAGNOSIS — Z20.2 EXPOSURE TO SEXUALLY TRANSMITTED DISEASE (STD): Primary | ICD-10-CM

## 2022-10-14 DIAGNOSIS — Z11.59 NEED FOR HEPATITIS C SCREENING TEST: ICD-10-CM

## 2022-10-14 LAB
HEPATITIS B SURFACE ANTIGEN INTERPRETATION: NORMAL
HEPATITIS C ANTIBODY INTERPRETATION: NORMAL

## 2022-10-14 PROCEDURE — 99213 OFFICE O/P EST LOW 20 MIN: CPT | Performed by: FAMILY MEDICINE

## 2022-10-14 PROCEDURE — 90471 IMMUNIZATION ADMIN: CPT | Performed by: FAMILY MEDICINE

## 2022-10-14 PROCEDURE — 90651 9VHPV VACCINE 2/3 DOSE IM: CPT | Performed by: FAMILY MEDICINE

## 2022-10-14 ASSESSMENT — PATIENT HEALTH QUESTIONNAIRE - PHQ9
1. LITTLE INTEREST OR PLEASURE IN DOING THINGS: 0
SUM OF ALL RESPONSES TO PHQ QUESTIONS 1-9: 0
9. THOUGHTS THAT YOU WOULD BE BETTER OFF DEAD, OR OF HURTING YOURSELF: 0
SUM OF ALL RESPONSES TO PHQ9 QUESTIONS 1 & 2: 0
4. FEELING TIRED OR HAVING LITTLE ENERGY: 0
8. MOVING OR SPEAKING SO SLOWLY THAT OTHER PEOPLE COULD HAVE NOTICED. OR THE OPPOSITE, BEING SO FIGETY OR RESTLESS THAT YOU HAVE BEEN MOVING AROUND A LOT MORE THAN USUAL: 0
3. TROUBLE FALLING OR STAYING ASLEEP: 0
SUM OF ALL RESPONSES TO PHQ QUESTIONS 1-9: 0
SUM OF ALL RESPONSES TO PHQ QUESTIONS 1-9: 0
5. POOR APPETITE OR OVEREATING: 0
7. TROUBLE CONCENTRATING ON THINGS, SUCH AS READING THE NEWSPAPER OR WATCHING TELEVISION: 0
2. FEELING DOWN, DEPRESSED OR HOPELESS: 0
SUM OF ALL RESPONSES TO PHQ QUESTIONS 1-9: 0
10. IF YOU CHECKED OFF ANY PROBLEMS, HOW DIFFICULT HAVE THESE PROBLEMS MADE IT FOR YOU TO DO YOUR WORK, TAKE CARE OF THINGS AT HOME, OR GET ALONG WITH OTHER PEOPLE: 0
6. FEELING BAD ABOUT YOURSELF - OR THAT YOU ARE A FAILURE OR HAVE LET YOURSELF OR YOUR FAMILY DOWN: 0

## 2022-10-14 ASSESSMENT — ENCOUNTER SYMPTOMS: SHORTNESS OF BREATH: 0

## 2022-10-14 NOTE — PROGRESS NOTES
Chief Complaint   Patient presents with    Other     Patient is requesting STD testing         HPI:  Estella Reis is a 44 y.o. (: 1985) here today to discuss STD testing. Patient is also wanting to discuss HPV vaccine. No hx of abn pap or warts. HPI  Concern that  is cheating on her. Concerned about potential exposure. No current symptoms. Has not seen gyn since prior hysterectomy. Patient's medications, allergies, past medical, surgical, social and family histories were reviewed and updated as appropriate. ROS:  Review of Systems   Constitutional:  Negative for fever. Respiratory:  Negative for shortness of breath. Genitourinary:  Negative for vaginal discharge and vaginal pain. No results found for: LABA1C, LABMICR, LDLCALC    No past medical history on file. Family History   Problem Relation Age of Onset    Cancer Other     Thyroid Disease Mother     Thyroid Disease Maternal Grandmother        Social History     Socioeconomic History    Marital status:      Spouse name: Not on file    Number of children: Not on file    Years of education: Not on file    Highest education level: Not on file   Occupational History    Not on file   Tobacco Use    Smoking status: Never    Smokeless tobacco: Never   Substance and Sexual Activity    Alcohol use: Yes     Comment: occ    Drug use: Not on file    Sexual activity: Not on file   Other Topics Concern    Not on file   Social History Narrative    Not on file     Social Determinants of Health     Financial Resource Strain: Not on file   Food Insecurity: Not on file   Transportation Needs: Not on file   Physical Activity: Not on file   Stress: Not on file   Social Connections: Not on file   Intimate Partner Violence: Not on file   Housing Stability: Not on file       Prior to Visit Medications    Medication Sig Taking?  Authorizing Provider   fluticasone (FLONASE) 50 MCG/ACT nasal spray 2 sprays by Nasal route daily Yes Lauraine Cowden, MD   ibuprofen (ADVIL;MOTRIN) 600 MG tablet Take 1 tablet by mouth 3 times daily as needed for Pain Yes Lauraine Cowden, MD       No Known Allergies    OBJECTIVE:    /80   Pulse 74   Ht 5' 1.02\" (1.55 m)   Wt 196 lb (88.9 kg)   SpO2 97%   BMI 37.01 kg/m²     BP Readings from Last 2 Encounters:   10/14/22 122/80   12/27/21 120/76       Wt Readings from Last 3 Encounters:   10/14/22 196 lb (88.9 kg)   12/27/21 202 lb 9.6 oz (91.9 kg)   06/16/21 189 lb (85.7 kg)       Physical Exam  Constitutional:       Appearance: Normal appearance. HENT:      Head: Normocephalic and atraumatic. Eyes:      Extraocular Movements: Extraocular movements intact. Cardiovascular:      Rate and Rhythm: Normal rate and regular rhythm. Pulmonary:      Effort: Pulmonary effort is normal.      Breath sounds: Normal breath sounds. Skin:     General: Skin is warm and dry. Neurological:      General: No focal deficit present. Mental Status: She is alert and oriented to person, place, and time. Psychiatric:         Mood and Affect: Mood normal.         Behavior: Behavior normal.         ASSESSMENT/PLAN:    1. Exposure to sexually transmitted disease (STD)  Patient concerned with possible exposure to sexually transmitted diseases. States her  has cheated on her potentially. Desires testing. Discussed options. Testing as below. Syphilis testing as well.  - C.trachomatis N.gonorrhoeae DNA  - HIV Screen  - Hepatitis B Surface Antigen  - Hepatitis C Antibody    2. Need for HPV vaccination  Vaccine today. Second dose in approximately 1 to 2 months. Third dose in approximately 6 months. - HPV, GARDASIL 9, (age 9-45 yrs), IM    3. Screening for HIV (human immunodeficiency virus)  Check labs  - HIV Screen    4. Need for hepatitis B screening test    - Hepatitis B Surface Antigen    5.  Need for hepatitis C screening test    - Hepatitis C Antibody        This document was prepared by jazzy combination of typing and transcription through a voice recognition software.

## 2022-10-15 LAB
C TRACH DNA GENITAL QL NAA+PROBE: NEGATIVE
HIV AG/AB: NORMAL
HIV ANTIGEN: NORMAL
HIV-1 ANTIBODY: NORMAL
HIV-2 AB: NORMAL
N. GONORRHOEAE DNA: NEGATIVE
TOTAL SYPHILLIS IGG/IGM: NORMAL

## 2022-11-08 ENCOUNTER — TELEPHONE (OUTPATIENT)
Dept: FAMILY MEDICINE CLINIC | Age: 37
End: 2022-11-08

## 2022-11-08 NOTE — TELEPHONE ENCOUNTER
One day of ST and sinus headache does not necessarily mean a bacterial infxn.   Symptomatic Therapy Suggested: rest, increase fluids, use mist of vaporizer prn, OTC Flonase, and call prn if symptoms persist or worsen

## 2022-11-08 NOTE — TELEPHONE ENCOUNTER
Patient woke up this morning with sore throat and sinus headache. Patient is asking for a cheap antibiotic to be called in. She has recently started a new job and can't miss work.

## 2022-11-09 ENCOUNTER — TELEPHONE (OUTPATIENT)
Dept: FAMILY MEDICINE CLINIC | Age: 37
End: 2022-11-09

## 2022-11-09 DIAGNOSIS — J34.89 SINUS PRESSURE: Primary | ICD-10-CM

## 2022-11-09 RX ORDER — AMOXICILLIN 875 MG/1
875 TABLET, COATED ORAL 2 TIMES DAILY
Qty: 14 TABLET | Refills: 0 | Status: SHIPPED | OUTPATIENT
Start: 2022-11-09 | End: 2022-11-16

## 2022-11-09 NOTE — TELEPHONE ENCOUNTER
Pt called and said that she has tried the OTC on Sunday and nothing is helping. She is unable to sleep due to being so stopped up. Sore throat, nothing OTC is working. Very tender and sore around eyes and real bad sinus headaches.  Nirav in [facility]

## 2022-12-08 ENCOUNTER — NURSE ONLY (OUTPATIENT)
Dept: FAMILY MEDICINE CLINIC | Age: 37
End: 2022-12-08
Payer: COMMERCIAL

## 2022-12-08 DIAGNOSIS — Z23 NEED FOR HPV VACCINATION: Primary | ICD-10-CM

## 2022-12-08 PROCEDURE — 90651 9VHPV VACCINE 2/3 DOSE IM: CPT | Performed by: FAMILY MEDICINE

## 2022-12-08 PROCEDURE — 90471 IMMUNIZATION ADMIN: CPT | Performed by: FAMILY MEDICINE

## 2023-01-25 ENCOUNTER — TELEPHONE (OUTPATIENT)
Dept: FAMILY MEDICINE CLINIC | Age: 38
End: 2023-01-25

## 2023-01-25 RX ORDER — AMOXICILLIN 875 MG/1
875 TABLET, COATED ORAL 2 TIMES DAILY
Qty: 14 TABLET | Refills: 0 | Status: SHIPPED | OUTPATIENT
Start: 2023-01-25 | End: 2023-02-01

## 2023-01-25 NOTE — TELEPHONE ENCOUNTER
Pt has sinus congestion, headache, sinus pressure, x 5 days. Taking otc tylenol sinus and mucinex with no relief. Asking for a antibiotic to be called in. Cheap as possible.

## 2023-01-25 NOTE — TELEPHONE ENCOUNTER
Symptomatic Therapy Suggested: rest, increase fluids, apply heat to sinuses prn, use mist of vaporizer prn, and call prn if symptoms persist or worsen amoxicillin 875mg po bid x 7d.

## 2023-03-09 ENCOUNTER — NURSE ONLY (OUTPATIENT)
Dept: FAMILY MEDICINE CLINIC | Age: 38
End: 2023-03-09
Payer: COMMERCIAL

## 2023-03-09 DIAGNOSIS — Z23 NEED FOR HPV VACCINATION: Primary | ICD-10-CM

## 2023-03-09 PROCEDURE — 90651 9VHPV VACCINE 2/3 DOSE IM: CPT | Performed by: FAMILY MEDICINE

## 2023-03-09 PROCEDURE — 90471 IMMUNIZATION ADMIN: CPT | Performed by: FAMILY MEDICINE

## 2023-05-16 ENCOUNTER — TELEPHONE (OUTPATIENT)
Dept: FAMILY MEDICINE CLINIC | Age: 38
End: 2023-05-16

## 2023-05-16 DIAGNOSIS — J06.9 UPPER RESPIRATORY TRACT INFECTION, UNSPECIFIED TYPE: Primary | ICD-10-CM

## 2023-05-16 RX ORDER — AMOXICILLIN 875 MG/1
875 TABLET, COATED ORAL 2 TIMES DAILY
Qty: 14 TABLET | Refills: 0 | Status: SHIPPED | OUTPATIENT
Start: 2023-05-16 | End: 2023-05-23

## 2023-05-16 RX ORDER — AMOXICILLIN 875 MG/1
875 TABLET, COATED ORAL 2 TIMES DAILY
Qty: 20 TABLET | Refills: 0 | Status: SHIPPED | OUTPATIENT
Start: 2023-05-16 | End: 2023-05-16 | Stop reason: SDUPTHER

## 2023-05-16 NOTE — TELEPHONE ENCOUNTER
Symptomatic Therapy Suggested: rest, increase fluids, use mist of vaporizer prn, and call prn if symptoms persist or worsen. amoxcillin 875mg po bid x 7d.

## 2023-05-16 NOTE — TELEPHONE ENCOUNTER
Pt called and c/o congestion, sinus pressure, green snot, coughing up stuff, OTC meds not working, she's wondering if you could call something in to [facility] in [facility]?

## 2023-09-01 RX ORDER — OCTISALATE, AVOBENZONE, HOMOSALATE, AND OCTOCRYLENE 29.4; 29.4; 49; 25.48 MG/ML; MG/ML; MG/ML; MG/ML
4 LOTION TOPICAL DAILY
Qty: 30 CAPSULE | Refills: 1 | Status: SHIPPED | OUTPATIENT
Start: 2023-09-01

## 2023-09-05 RX ORDER — OCTISALATE, AVOBENZONE, HOMOSALATE, AND OCTOCRYLENE 29.4; 29.4; 49; 25.48 MG/ML; MG/ML; MG/ML; MG/ML
4 LOTION TOPICAL DAILY
Qty: 30 CAPSULE | Refills: 1 | Status: SHIPPED | OUTPATIENT
Start: 2023-09-05

## 2023-10-16 ENCOUNTER — TELEPHONE (OUTPATIENT)
Dept: FAMILY MEDICINE CLINIC | Age: 38
End: 2023-10-16

## 2023-10-16 NOTE — TELEPHONE ENCOUNTER
2-3 day hx of cough, congestion, st likely viral in nature.   Symptomatic Therapy Suggested: rest, increase fluids, use mist of vaporizer prn, OTC Delsym, and call prn if symptoms persist or worsen

## 2023-10-16 NOTE — TELEPHONE ENCOUNTER
Pt called and said she has congestion, sore throat, cough, since Saturday she uses [facility] in [address] and is wondering if you could send in an antibiotic

## 2023-10-16 NOTE — TELEPHONE ENCOUNTER
Pt states she also has sinus pain and pressure, headache and has to work, she can't rest due to coughing all night. OTC meds she tried aren't helping either.

## 2023-11-21 NOTE — LETTER
[address]  [address]  Phone: [phone]  Fax: [phone]    Massimo Zavala MD        October 25, 2017     Patient: Sally Richards   YOB: 1985   Date of Visit: 10/25/2017       To Whom it May Concern:    Sally Richards was seen in my clinic on 10/25/2017. She may return to work 10/26/2017. If you have any questions or concerns, please don't hesitate to call.     Sincerely,       Massimo Zavala MD .

## 2024-03-12 ENCOUNTER — TELEPHONE (OUTPATIENT)
Dept: FAMILY MEDICINE CLINIC | Age: 39
End: 2024-03-12

## 2024-03-12 RX ORDER — AMOXICILLIN 875 MG/1
875 TABLET, COATED ORAL 2 TIMES DAILY
Qty: 20 TABLET | Refills: 0 | Status: SHIPPED | OUTPATIENT
Start: 2024-03-12 | End: 2024-03-22

## 2024-03-12 NOTE — TELEPHONE ENCOUNTER
Symptomatic Therapy Suggested: rest, increase fluids, use mist of vaporizer prn, and call prn if symptoms persist or worsen amoxicillin 875mg po bid x 10d.  Will need appt prior to any additional tx over the phone.

## 2024-03-12 NOTE — TELEPHONE ENCOUNTER
Sinus congestion, pressure, migraine runny nose since Wednesday wanting to know if an antibiotic [facility]

## 2024-05-01 ENCOUNTER — E-VISIT (OUTPATIENT)
Dept: FAMILY MEDICINE CLINIC | Age: 39
End: 2024-05-01
Payer: COMMERCIAL

## 2024-05-01 DIAGNOSIS — L73.9 HAIR FOLLICLE INFECTION: Primary | ICD-10-CM

## 2024-05-01 PROCEDURE — 99422 OL DIG E/M SVC 11-20 MIN: CPT

## 2024-05-01 RX ORDER — SULFAMETHOXAZOLE AND TRIMETHOPRIM 800; 160 MG/1; MG/1
1 TABLET ORAL 2 TIMES DAILY
Qty: 14 TABLET | Refills: 0 | Status: SHIPPED | OUTPATIENT
Start: 2024-05-01 | End: 2024-05-08

## 2024-05-01 NOTE — PROGRESS NOTES
Trial pt on bactrim for infected hair. Follow up as need if no improvement. Reviewed evisit questionnaire.   No Known Allergies  Lab Results   Component Value Date     06/17/2021    K 4.5 06/17/2021     06/17/2021    CO2 26 06/17/2021    BUN 11 06/17/2021    CREATININE 1.0 06/17/2021    GLUCOSE 92 06/17/2021    CALCIUM 9.6 06/17/2021    BILITOT <0.2 07/21/2017    ALKPHOS 140 (H) 07/21/2017    AST 16 07/21/2017    ALT 16 07/21/2017    LABGLOM >60 06/17/2021    GFRAA >60 06/17/2021    AGRATIO 1.4 07/21/2017    GLOB 3.0 07/21/2017       Current Outpatient Medications   Medication Sig Dispense Refill    ibuprofen (ADVIL;MOTRIN) 600 MG tablet Take 1 tablet by mouth 3 times daily as needed for Pain 90 tablet 0    Probiotic Product (ALIGN) 4 MG CAPS Take 4 mg by mouth daily 30 capsule 1    fluticasone (FLONASE) 50 MCG/ACT nasal spray 2 sprays by Nasal route daily 16 g 3     No current facility-administered medications for this visit.      1. Hair follicle infection  See above note.   - sulfamethoxazole-trimethoprim (BACTRIM DS;SEPTRA DS) 800-160 MG per tablet; Take 1 tablet by mouth 2 times daily for 7 days  Dispense: 14 tablet; Refill: 0      11-20 min spent on pt care today.   This document was prepared by a combination of typing and transcription through a voice recognition software.  Conrad Marmolejo, APRN - CNP

## 2024-05-24 ENCOUNTER — HOSPITAL ENCOUNTER (OUTPATIENT)
Dept: WOMENS IMAGING | Age: 39
Discharge: HOME | End: 2024-05-24
Payer: COMMERCIAL

## 2024-05-24 VITALS — WEIGHT: 200 LBS | HEIGHT: 61 IN | BODY MASS INDEX: 37.76 KG/M2

## 2024-05-24 DIAGNOSIS — Z12.31 ENCOUNTER FOR SCREENING MAMMOGRAM FOR MALIGNANT NEOPLASM OF BREAST: ICD-10-CM

## 2024-05-24 PROCEDURE — 77063 BREAST TOMOSYNTHESIS BI: CPT

## 2024-08-28 ASSESSMENT — PATIENT HEALTH QUESTIONNAIRE - PHQ9
SUM OF ALL RESPONSES TO PHQ QUESTIONS 1-9: 0
SUM OF ALL RESPONSES TO PHQ QUESTIONS 1-9: 0
SUM OF ALL RESPONSES TO PHQ9 QUESTIONS 1 & 2: 0
SUM OF ALL RESPONSES TO PHQ QUESTIONS 1-9: 0
1. LITTLE INTEREST OR PLEASURE IN DOING THINGS: NOT AT ALL
SUM OF ALL RESPONSES TO PHQ9 QUESTIONS 1 & 2: 0
2. FEELING DOWN, DEPRESSED OR HOPELESS: NOT AT ALL
2. FEELING DOWN, DEPRESSED OR HOPELESS: NOT AT ALL
SUM OF ALL RESPONSES TO PHQ QUESTIONS 1-9: 0
1. LITTLE INTEREST OR PLEASURE IN DOING THINGS: NOT AT ALL

## 2024-08-30 ENCOUNTER — OFFICE VISIT (OUTPATIENT)
Dept: FAMILY MEDICINE CLINIC | Age: 39
End: 2024-08-30
Payer: COMMERCIAL

## 2024-08-30 VITALS
BODY MASS INDEX: 36.44 KG/M2 | SYSTOLIC BLOOD PRESSURE: 126 MMHG | DIASTOLIC BLOOD PRESSURE: 88 MMHG | OXYGEN SATURATION: 97 % | WEIGHT: 193 LBS | HEART RATE: 64 BPM | HEIGHT: 61 IN

## 2024-08-30 DIAGNOSIS — Z13.220 SCREENING, LIPID: ICD-10-CM

## 2024-08-30 DIAGNOSIS — Z00.00 WELL ADULT EXAM: Primary | ICD-10-CM

## 2024-08-30 LAB
ALBUMIN SERPL-MCNC: 4.4 G/DL (ref 3.4–5)
ALBUMIN/GLOB SERPL: 1.5 {RATIO} (ref 1.1–2.2)
ALP SERPL-CCNC: 137 U/L (ref 40–129)
ALT SERPL-CCNC: 27 U/L (ref 10–40)
ANION GAP SERPL CALCULATED.3IONS-SCNC: 11 MMOL/L (ref 3–16)
AST SERPL-CCNC: 29 U/L (ref 15–37)
BILIRUB SERPL-MCNC: 0.4 MG/DL (ref 0–1)
BUN SERPL-MCNC: 14 MG/DL (ref 7–20)
CALCIUM SERPL-MCNC: 9.4 MG/DL (ref 8.3–10.6)
CHLORIDE SERPL-SCNC: 105 MMOL/L (ref 99–110)
CHOLEST SERPL-MCNC: 186 MG/DL (ref 0–199)
CO2 SERPL-SCNC: 22 MMOL/L (ref 21–32)
CREAT SERPL-MCNC: 0.9 MG/DL (ref 0.6–1.1)
GFR SERPLBLD CREATININE-BSD FMLA CKD-EPI: 83 ML/MIN/{1.73_M2}
GLUCOSE SERPL-MCNC: 90 MG/DL (ref 70–99)
HDLC SERPL-MCNC: 41 MG/DL (ref 40–60)
LDLC SERPL CALC-MCNC: 119 MG/DL
POTASSIUM SERPL-SCNC: 4 MMOL/L (ref 3.5–5.1)
PROT SERPL-MCNC: 7.3 G/DL (ref 6.4–8.2)
SODIUM SERPL-SCNC: 138 MMOL/L (ref 136–145)
TRIGL SERPL-MCNC: 129 MG/DL (ref 0–150)
VLDLC SERPL CALC-MCNC: 26 MG/DL

## 2024-08-30 PROCEDURE — 99396 PREV VISIT EST AGE 40-64: CPT | Performed by: FAMILY MEDICINE

## 2024-08-30 SDOH — ECONOMIC STABILITY: FOOD INSECURITY: WITHIN THE PAST 12 MONTHS, YOU WORRIED THAT YOUR FOOD WOULD RUN OUT BEFORE YOU GOT MONEY TO BUY MORE.: NEVER TRUE

## 2024-08-30 SDOH — ECONOMIC STABILITY: FOOD INSECURITY: WITHIN THE PAST 12 MONTHS, THE FOOD YOU BOUGHT JUST DIDN'T LAST AND YOU DIDN'T HAVE MONEY TO GET MORE.: NEVER TRUE

## 2024-08-30 SDOH — ECONOMIC STABILITY: INCOME INSECURITY: HOW HARD IS IT FOR YOU TO PAY FOR THE VERY BASICS LIKE FOOD, HOUSING, MEDICAL CARE, AND HEATING?: NOT HARD AT ALL

## 2024-08-30 ASSESSMENT — ENCOUNTER SYMPTOMS
SHORTNESS OF BREATH: 0
BLOOD IN STOOL: 0
DIARRHEA: 0
CONSTIPATION: 0

## 2024-08-30 NOTE — PROGRESS NOTES
Chief Complaint   Patient presents with    Annual Exam       HPI:  Leah Robles is a 39 y.o. (: 1985) here today   for annual wellness exam.  HPI  Overall doing well. Needs wellness for work.      Needs labs today.      No other new sxs.      Some inc amt of bm recently.  No sig constipation or diarrhea.  Worse w/ heat.      No family hx of colon cancer noted.     Patient's medications, allergies, past medical, surgical, social and family histories were reviewed and updated as appropriate.    ROS:  Review of Systems   Constitutional:  Negative for fever.   Respiratory:  Negative for shortness of breath.    Cardiovascular:  Negative for chest pain and palpitations.   Gastrointestinal:  Negative for blood in stool, constipation and diarrhea.           No results found for: \"LABA1C\", \"LDLDIRECT\"    No past medical history on file.    Family History   Problem Relation Age of Onset    Cancer Other     Thyroid Disease Mother     Thyroid Disease Maternal Grandmother        Social History     Socioeconomic History    Marital status:      Spouse name: Not on file    Number of children: Not on file    Years of education: Not on file    Highest education level: Not on file   Occupational History    Not on file   Tobacco Use    Smoking status: Never    Smokeless tobacco: Never   Substance and Sexual Activity    Alcohol use: Yes     Comment: occ    Drug use: Never    Sexual activity: Not Currently     Partners: Male   Other Topics Concern    Not on file   Social History Narrative    Not on file     Social Determinants of Health     Financial Resource Strain: Low Risk  (2024)    Overall Financial Resource Strain (CARDIA)     Difficulty of Paying Living Expenses: Not hard at all   Food Insecurity: No Food Insecurity (2024)    Hunger Vital Sign     Worried About Running Out of Food in the Last Year: Never true     Ran Out of Food in the Last Year: Never true   Transportation Needs: Unknown (2024)   We did discuss vaccines.  Patient is not interested in other vaccinations at this point.  We discussed tetanus booster.  Continue to work on appropriate diet and activity.  - Lipid Panel  - Comprehensive Metabolic Panel    2. Screening, lipid  See above.  Fasting labs today.  Will plan on filling out the wellness form in relation to that.  - Lipid Panel  - Comprehensive Metabolic Panel

## 2024-09-20 ENCOUNTER — OFFICE VISIT (OUTPATIENT)
Dept: FAMILY MEDICINE CLINIC | Age: 39
End: 2024-09-20
Payer: COMMERCIAL

## 2024-09-20 VITALS
BODY MASS INDEX: 37.57 KG/M2 | HEIGHT: 61 IN | HEART RATE: 86 BPM | OXYGEN SATURATION: 98 % | DIASTOLIC BLOOD PRESSURE: 84 MMHG | WEIGHT: 199 LBS | SYSTOLIC BLOOD PRESSURE: 110 MMHG

## 2024-09-20 DIAGNOSIS — L02.415 ABSCESS OF RIGHT KNEE: Primary | ICD-10-CM

## 2024-09-20 PROCEDURE — 99213 OFFICE O/P EST LOW 20 MIN: CPT | Performed by: FAMILY MEDICINE

## 2024-09-20 RX ORDER — MUPIROCIN 20 MG/G
OINTMENT TOPICAL
Qty: 30 G | Refills: 0 | Status: SHIPPED | OUTPATIENT
Start: 2024-09-20 | End: 2024-09-27

## 2024-09-20 RX ORDER — DOXYCYCLINE HYCLATE 100 MG
100 TABLET ORAL 2 TIMES DAILY
Qty: 10 TABLET | Refills: 0 | Status: SHIPPED | OUTPATIENT
Start: 2024-09-20 | End: 2024-09-25

## 2025-03-19 ENCOUNTER — TELEPHONE (OUTPATIENT)
Dept: FAMILY MEDICINE CLINIC | Age: 40
End: 2025-03-19

## 2025-03-19 RX ORDER — HYDROCORTISONE 25 MG/G
CREAM TOPICAL
Qty: 28 G | Refills: 0 | Status: SHIPPED | OUTPATIENT
Start: 2025-03-19

## 2025-03-19 NOTE — TELEPHONE ENCOUNTER
Could try Anusol cream.  Apply twice daily.  If ongoing symptoms, may need to consider referral to colorectal specialist

## 2025-03-19 NOTE — TELEPHONE ENCOUNTER
Pt states she has sharp pain and burning after Bms, tried hot baths and epsom salt. Pt is wondering if you can send something in?